# Patient Record
Sex: MALE | Race: OTHER
[De-identification: names, ages, dates, MRNs, and addresses within clinical notes are randomized per-mention and may not be internally consistent; named-entity substitution may affect disease eponyms.]

---

## 2021-06-08 ENCOUNTER — RESULT REVIEW (OUTPATIENT)
Age: 51
End: 2021-06-08

## 2023-12-15 PROBLEM — Z00.00 ENCOUNTER FOR PREVENTIVE HEALTH EXAMINATION: Status: ACTIVE | Noted: 2023-12-15

## 2023-12-18 ENCOUNTER — APPOINTMENT (OUTPATIENT)
Dept: ORTHOPEDIC SURGERY | Facility: CLINIC | Age: 53
End: 2023-12-18
Payer: COMMERCIAL

## 2023-12-18 VITALS — HEIGHT: 66 IN | BODY MASS INDEX: 26.36 KG/M2 | WEIGHT: 164 LBS

## 2023-12-18 DIAGNOSIS — M54.16 RADICULOPATHY, LUMBAR REGION: ICD-10-CM

## 2023-12-18 DIAGNOSIS — E11.9 TYPE 2 DIABETES MELLITUS W/OUT COMPLICATIONS: ICD-10-CM

## 2023-12-18 PROCEDURE — 72110 X-RAY EXAM L-2 SPINE 4/>VWS: CPT

## 2023-12-18 PROCEDURE — 99204 OFFICE O/P NEW MOD 45 MIN: CPT | Mod: 25

## 2023-12-18 PROCEDURE — 72170 X-RAY EXAM OF PELVIS: CPT

## 2023-12-18 PROCEDURE — 20552 NJX 1/MLT TRIGGER POINT 1/2: CPT

## 2023-12-18 RX ORDER — TIZANIDINE 4 MG/1
4 TABLET ORAL TWICE DAILY
Qty: 20 | Refills: 0 | Status: ACTIVE | COMMUNITY
Start: 2023-12-18 | End: 1900-01-01

## 2023-12-18 RX ORDER — DULAGLUTIDE 4.5 MG/.5ML
INJECTION, SOLUTION SUBCUTANEOUS
Refills: 0 | Status: ACTIVE | COMMUNITY

## 2023-12-18 RX ORDER — METFORMIN HYDROCHLORIDE 625 MG/1
TABLET ORAL
Refills: 0 | Status: ACTIVE | COMMUNITY

## 2024-01-08 NOTE — HISTORY OF PRESENT ILLNESS
[de-identified] : 54 yo M presenting with low back and RLE pain x 3 weeks, no injury. Pain and numbness radiates into the posterior thigh. Pain aggravated with prolonged sitting. hx of sciatica in the past, treated with chiro with relief. aleve prn. PT in the past helped. No prior injections or spinal surgeries. No bb incontinence.  [] : no [FreeTextEntry5] : no reported injury, pain started 11.25.2023. Patient states he history of lower back pain; numbness/tingling down the left leg.

## 2024-01-08 NOTE — PROCEDURE
[FreeTextEntry3] : The risks, benefits and contents of the injection have been discussed.  Risks include but are not limited to allergic reaction, flare reaction, permanent white skin discoloration at the injection site and infection.  The patient understands the risks and agrees to having the injection.  All questions have been answered.  Trigger point injection was administered into the left lumbar paraspinal muscle. The site was prepped with alcohol and betadine. An injection of Lidocaine 4cc of 1% , kenalog 60mg was administered. Patient tolerated procedure well.   Patient was advised to call if redness, pain or fever occur and apply ice for 15 minutes out of every hour for the next 12-24 hours as tolerated.   Patient has tried OTC's including aspirin, Ibuprofen, Aleve, etc or prescription NSAIDS, and/or exercises at home and/or physical therapy without satisfactory response, patient had decreased mobility and the risks benefits, and alternatives have been discussed, and verbal consent was obtained.

## 2024-01-08 NOTE — ASSESSMENT
[FreeTextEntry1] : 52 yo M with acute on chronic LLE radiculopathy. Familiar pain, has responded well to PT/chiro and nsaids in the past. XRs with loss of disc height at L5-S1, no fractures/lesions or instability. Neuro intact. Recommend he start PT. TPI into the L paraspinal muscle today tolerated well. tizanidine rx. if symptoms persist despite this over the next 3-4 weeeks he'll give us a call and we'll set him up for MRI LS and he'll bring it in to review results. Progress note completed by Althea Bowman PA-C under the supervision of Nate Neil MD

## 2024-01-08 NOTE — IMAGING
[Disc space narrowing] : Disc space narrowing [No spinal deformity, fracture, lytic lesion, or marked single level collapse] : No spinal deformity, fracture, lytic lesion, or marked single level collapse [No instability seen on flexion/extension] : No instability seen on flexion/extension [de-identified] : left lumbar paraspinal muscle spasms and tedneress   pain with flexion relief extension diminished ROM in all planes   motor exam 5/5 strength bilaterally sensory intact + L SLR L S1 dermatome of pain   ambulates without assistive device non antalgic gait able to heel/toe walk      [FreeTextEntry1] : L5-S1 [de-identified] : negative

## 2024-01-16 ENCOUNTER — APPOINTMENT (OUTPATIENT)
Dept: ORTHOPEDIC SURGERY | Facility: CLINIC | Age: 54
End: 2024-01-16